# Patient Record
Sex: MALE | Race: WHITE | NOT HISPANIC OR LATINO | URBAN - METROPOLITAN AREA
[De-identification: names, ages, dates, MRNs, and addresses within clinical notes are randomized per-mention and may not be internally consistent; named-entity substitution may affect disease eponyms.]

---

## 2023-04-17 DIAGNOSIS — F41.9 ANXIETY: Primary | ICD-10-CM

## 2023-04-17 PROBLEM — L23.7 POISON IVY: Status: ACTIVE | Noted: 2023-04-17

## 2023-04-17 PROBLEM — L23.7 POISON SUMAC: Status: ACTIVE | Noted: 2023-04-17

## 2023-04-17 PROBLEM — I10 ESSENTIAL HYPERTENSION: Status: ACTIVE | Noted: 2023-04-17

## 2023-04-17 PROBLEM — J01.90 ACUTE SINUSITIS: Status: ACTIVE | Noted: 2023-04-17

## 2023-04-17 PROBLEM — Z86.711 HISTORY OF PULMONARY EMBOLISM: Status: ACTIVE | Noted: 2023-04-17

## 2023-04-17 PROBLEM — L25.5 DERMATITIS DUE TO PLANTS, INCLUDING POISON IVY, SUMAC, AND OAK: Status: ACTIVE | Noted: 2023-04-17

## 2023-04-17 PROBLEM — E78.2 HYPERLIPIDEMIA, MIXED: Status: ACTIVE | Noted: 2023-04-17

## 2023-04-17 RX ORDER — PAROXETINE HYDROCHLORIDE 20 MG/1
20 TABLET, FILM COATED ORAL DAILY
Qty: 30 TABLET | Refills: 3 | Status: SHIPPED | OUTPATIENT
Start: 2023-04-17 | End: 2023-12-19

## 2023-04-17 RX ORDER — LISINOPRIL 10 MG/1
1 TABLET ORAL DAILY
COMMUNITY
Start: 2015-09-02 | End: 2023-11-27

## 2023-04-17 RX ORDER — WARFARIN 6 MG/1
1 TABLET ORAL DAILY
COMMUNITY
Start: 2015-08-03 | End: 2023-10-20 | Stop reason: SDUPTHER

## 2023-04-17 RX ORDER — ATORVASTATIN CALCIUM 20 MG/1
1 TABLET, FILM COATED ORAL DAILY
COMMUNITY
Start: 2015-06-04 | End: 2023-10-20 | Stop reason: SDUPTHER

## 2023-04-17 RX ORDER — PAROXETINE HYDROCHLORIDE 20 MG/1
1 TABLET, FILM COATED ORAL DAILY
COMMUNITY
Start: 2015-05-28 | End: 2023-04-17 | Stop reason: SDUPTHER

## 2023-04-17 RX ORDER — LANSOPRAZOLE 30 MG/1
1 CAPSULE, DELAYED RELEASE ORAL 2 TIMES DAILY
COMMUNITY
Start: 2015-01-19 | End: 2023-11-27

## 2023-10-09 ENCOUNTER — APPOINTMENT (OUTPATIENT)
Dept: PRIMARY CARE | Facility: CLINIC | Age: 58
End: 2023-10-09
Payer: COMMERCIAL

## 2023-10-16 ASSESSMENT — PROMIS GLOBAL HEALTH SCALE
RATE_SOCIAL_SATISFACTION: EXCELLENT
RATE_PHYSICAL_HEALTH: VERY GOOD
RATE_MENTAL_HEALTH: EXCELLENT
EMOTIONAL_PROBLEMS: NEVER
RATE_GENERAL_HEALTH: EXCELLENT
RATE_AVERAGE_PAIN: 1
RATE_QUALITY_OF_LIFE: EXCELLENT
CARRYOUT_PHYSICAL_ACTIVITIES: COMPLETELY
CARRYOUT_SOCIAL_ACTIVITIES: EXCELLENT

## 2023-10-18 ENCOUNTER — OFFICE VISIT (OUTPATIENT)
Dept: PRIMARY CARE | Facility: CLINIC | Age: 58
End: 2023-10-18
Payer: COMMERCIAL

## 2023-10-18 ENCOUNTER — LAB (OUTPATIENT)
Dept: LAB | Facility: LAB | Age: 58
End: 2023-10-18
Payer: COMMERCIAL

## 2023-10-18 VITALS
SYSTOLIC BLOOD PRESSURE: 110 MMHG | DIASTOLIC BLOOD PRESSURE: 80 MMHG | TEMPERATURE: 97.3 F | BODY MASS INDEX: 26.01 KG/M2 | WEIGHT: 192 LBS | HEIGHT: 72 IN | HEART RATE: 65 BPM | OXYGEN SATURATION: 96 %

## 2023-10-18 DIAGNOSIS — Z12.11 ENCOUNTER FOR SCREENING FOR MALIGNANT NEOPLASM OF COLON: ICD-10-CM

## 2023-10-18 DIAGNOSIS — Z51.81 ANTICOAGULATION GOAL OF INR 2 TO 3: ICD-10-CM

## 2023-10-18 DIAGNOSIS — Z00.00 ROUTINE GENERAL MEDICAL EXAMINATION AT A HEALTH CARE FACILITY: Primary | ICD-10-CM

## 2023-10-18 DIAGNOSIS — Z79.01 ANTICOAGULATION GOAL OF INR 2 TO 3: ICD-10-CM

## 2023-10-18 DIAGNOSIS — E78.2 HYPERLIPIDEMIA, MIXED: ICD-10-CM

## 2023-10-18 DIAGNOSIS — I10 ESSENTIAL HYPERTENSION: ICD-10-CM

## 2023-10-18 DIAGNOSIS — Z00.00 ROUTINE GENERAL MEDICAL EXAMINATION AT A HEALTH CARE FACILITY: ICD-10-CM

## 2023-10-18 LAB
ALBUMIN SERPL BCP-MCNC: 4.6 G/DL (ref 3.4–5)
ALP SERPL-CCNC: 52 U/L (ref 33–120)
ALT SERPL W P-5'-P-CCNC: 19 U/L (ref 10–52)
ANION GAP SERPL CALC-SCNC: 13 MMOL/L (ref 10–20)
APPEARANCE UR: CLEAR
AST SERPL W P-5'-P-CCNC: 16 U/L (ref 9–39)
BASOPHILS # BLD AUTO: 0.03 X10*3/UL (ref 0–0.1)
BASOPHILS NFR BLD AUTO: 0.6 %
BILIRUB SERPL-MCNC: 0.6 MG/DL (ref 0–1.2)
BILIRUB UR STRIP.AUTO-MCNC: NEGATIVE MG/DL
BUN SERPL-MCNC: 19 MG/DL (ref 6–23)
CALCIUM SERPL-MCNC: 9.2 MG/DL (ref 8.6–10.6)
CHLORIDE SERPL-SCNC: 106 MMOL/L (ref 98–107)
CHOLEST SERPL-MCNC: 287 MG/DL (ref 0–199)
CHOLESTEROL/HDL RATIO: 5.4
CO2 SERPL-SCNC: 25 MMOL/L (ref 21–32)
COLOR UR: YELLOW
CREAT SERPL-MCNC: 0.86 MG/DL (ref 0.5–1.3)
EOSINOPHIL # BLD AUTO: 0.34 X10*3/UL (ref 0–0.7)
EOSINOPHIL NFR BLD AUTO: 7.3 %
ERYTHROCYTE [DISTWIDTH] IN BLOOD BY AUTOMATED COUNT: 12.6 % (ref 11.5–14.5)
GFR SERPL CREATININE-BSD FRML MDRD: >90 ML/MIN/1.73M*2
GLUCOSE SERPL-MCNC: 97 MG/DL (ref 74–99)
GLUCOSE UR STRIP.AUTO-MCNC: NEGATIVE MG/DL
HCT VFR BLD AUTO: 43.9 % (ref 41–52)
HCV AB SER QL: NONREACTIVE
HDLC SERPL-MCNC: 53.3 MG/DL
HGB BLD-MCNC: 14.6 G/DL (ref 13.5–17.5)
HIV 1+2 AB+HIV1 P24 AG SERPL QL IA: NONREACTIVE
IMM GRANULOCYTES # BLD AUTO: 0.01 X10*3/UL (ref 0–0.7)
IMM GRANULOCYTES NFR BLD AUTO: 0.2 % (ref 0–0.9)
INR PPP: 1.3 (ref 0.9–1.1)
KETONES UR STRIP.AUTO-MCNC: NEGATIVE MG/DL
LDLC SERPL CALC-MCNC: 218 MG/DL
LEUKOCYTE ESTERASE UR QL STRIP.AUTO: NEGATIVE
LYMPHOCYTES # BLD AUTO: 1.82 X10*3/UL (ref 1.2–4.8)
LYMPHOCYTES NFR BLD AUTO: 39.2 %
MCH RBC QN AUTO: 31.8 PG (ref 26–34)
MCHC RBC AUTO-ENTMCNC: 33.3 G/DL (ref 32–36)
MCV RBC AUTO: 96 FL (ref 80–100)
MONOCYTES # BLD AUTO: 0.35 X10*3/UL (ref 0.1–1)
MONOCYTES NFR BLD AUTO: 7.5 %
NEUTROPHILS # BLD AUTO: 2.09 X10*3/UL (ref 1.2–7.7)
NEUTROPHILS NFR BLD AUTO: 45.2 %
NITRITE UR QL STRIP.AUTO: NEGATIVE
NON HDL CHOLESTEROL: 234 MG/DL (ref 0–149)
NRBC BLD-RTO: 0 /100 WBCS (ref 0–0)
PH UR STRIP.AUTO: 5 [PH]
PLATELET # BLD AUTO: 222 X10*3/UL (ref 150–450)
PMV BLD AUTO: 9.5 FL (ref 7.5–11.5)
POTASSIUM SERPL-SCNC: 4.3 MMOL/L (ref 3.5–5.3)
PROT SERPL-MCNC: 7 G/DL (ref 6.4–8.2)
PROT UR STRIP.AUTO-MCNC: NEGATIVE MG/DL
PROTHROMBIN TIME: 14.2 SECONDS (ref 9.8–12.8)
PSA SERPL-MCNC: 0.41 NG/ML
RBC # BLD AUTO: 4.59 X10*6/UL (ref 4.5–5.9)
RBC # UR STRIP.AUTO: NEGATIVE /UL
SODIUM SERPL-SCNC: 140 MMOL/L (ref 136–145)
SP GR UR STRIP.AUTO: 1.02
TRIGL SERPL-MCNC: 77 MG/DL (ref 0–149)
UROBILINOGEN UR STRIP.AUTO-MCNC: <2 MG/DL
VLDL: 15 MG/DL (ref 0–40)
WBC # BLD AUTO: 4.6 X10*3/UL (ref 4.4–11.3)

## 2023-10-18 PROCEDURE — 84153 ASSAY OF PSA TOTAL: CPT

## 2023-10-18 PROCEDURE — 81003 URINALYSIS AUTO W/O SCOPE: CPT

## 2023-10-18 PROCEDURE — 86803 HEPATITIS C AB TEST: CPT

## 2023-10-18 PROCEDURE — 36415 COLL VENOUS BLD VENIPUNCTURE: CPT

## 2023-10-18 PROCEDURE — 85025 COMPLETE CBC W/AUTO DIFF WBC: CPT

## 2023-10-18 PROCEDURE — 87389 HIV-1 AG W/HIV-1&-2 AB AG IA: CPT

## 2023-10-18 PROCEDURE — 3074F SYST BP LT 130 MM HG: CPT | Performed by: FAMILY MEDICINE

## 2023-10-18 PROCEDURE — 3079F DIAST BP 80-89 MM HG: CPT | Performed by: FAMILY MEDICINE

## 2023-10-18 PROCEDURE — 80053 COMPREHEN METABOLIC PANEL: CPT

## 2023-10-18 PROCEDURE — 85610 PROTHROMBIN TIME: CPT

## 2023-10-18 PROCEDURE — 99396 PREV VISIT EST AGE 40-64: CPT | Performed by: FAMILY MEDICINE

## 2023-10-18 PROCEDURE — 1036F TOBACCO NON-USER: CPT | Performed by: FAMILY MEDICINE

## 2023-10-18 PROCEDURE — 80061 LIPID PANEL: CPT

## 2023-10-18 ASSESSMENT — PATIENT HEALTH QUESTIONNAIRE - PHQ9
1. LITTLE INTEREST OR PLEASURE IN DOING THINGS: NOT AT ALL
SUM OF ALL RESPONSES TO PHQ9 QUESTIONS 1 & 2: 0
2. FEELING DOWN, DEPRESSED OR HOPELESS: NOT AT ALL

## 2023-10-18 ASSESSMENT — LIFESTYLE VARIABLES
HOW OFTEN DO YOU HAVE A DRINK CONTAINING ALCOHOL: NEVER
HOW OFTEN DO YOU HAVE SIX OR MORE DRINKS ON ONE OCCASION: NEVER

## 2023-10-18 ASSESSMENT — PAIN SCALES - GENERAL: PAINLEVEL: 0-NO PAIN

## 2023-10-18 NOTE — PROGRESS NOTES
Subjective   Patient ID: Satish Burton is a 58 y.o. male who presents for Annual Exam (Patient is here for CPE, patient is fasting. ).  HPI  58-year-old male presents for complete physical exam.  No acute complaints today.  Review of Systems  Constitutional: no chills, no fever and no night sweats.   Eyes: no blurred vision and no eyesight problems.   ENT: no hearing loss, no nasal congestion, no nasal discharge, no hoarseness and no sore throat.   Cardiovascular: no chest pain, no intermittent leg claudication, no lower extremity edema, no palpitations and no syncope.   Respiratory: no cough, no shortness of breath during exertion, no shortness of breath at rest and no wheezing.   Gastrointestinal: no abdominal pain, no blood in stools, no constipation, no diarrhea, no melena, no nausea, no rectal pain and no vomiting.   Genitourinary: no dysuria, no change in urinary frequency, no urinary hesitancy and no feelings of urinary urgency.   Neurological: no difficulty walking, no headache, no limb weakness, no numbness and no tingling.   Psychiatric: no anxiety, no depression, no anhedonia and no substance use disorders.   Endocrine: no recent weight gain and no recent weight loss.   Hematologic/Lymphatic: no tendency for easy bruising and no swollen glands.  Visit Vitals  /80 (BP Location: Left arm, Patient Position: Sitting, BP Cuff Size: Adult)   Pulse 65   Temp 36.3 °C (97.3 °F) (Temporal)        Objective   Physical Exam  Constitutional: Alert and in no acute distress. Well developed, well nourished.   Eyes: Pupils were equal in size, round, reactive to light (PERRL) with normal accommodation and extraocular movements intact (EOMI). Ophthalmoscopic examination: Normal.   Ears, Nose, Mouth, and Throat: External inspection of ears and nose: Normal. Otoscopic examination: Normal. Lips, teeth, and gums: Normal. Oropharynx: Normal.   Neck: No neck mass was observed. Supple. Thyroid not enlarged and there were no  palpable thyroid nodules.   Cardiovascular: Heart rate and rhythm were normal, normal S1 and S2, no gallops, no murmurs and no pericardial rub. Carotid pulses: Normal with no bruits. Abdominal aorta: Normal. Pedal pulses: Normal. No peripheral edema.   Pulmonary: No respiratory distress. Clear bilateral breath sounds.   Abdomen: Soft nontender; no abdominal mass palpated. Normal bowel sounds. No organomegaly.   Skin: Normal skin color and pigmentation, normal skin turgor, and no rash.   Psychiatric: Judgment and insight: Intact. Mood and affect: Normal.  Assessment/Plan   Problem List Items Addressed This Visit             ICD-10-CM    Essential hypertension I10    Hyperlipidemia, mixed E78.2    Routine general medical examination at a health care facility - Primary Z00.00    Relevant Orders    Lipid Panel    Comprehensive Metabolic Panel    CBC and Auto Differential    Urinalysis with Reflex Microscopic    Prostate Spec.Ag,Screen    HIV-1 and HIV-2 antibodies    Hepatitis C antibody    Anticoagulation goal of INR 2 to 3 Z51.81, Z79.01    Relevant Orders    Protime-INR     Other Visit Diagnoses         Codes    Encounter for screening for malignant neoplasm of colon     Z12.11    Relevant Orders    Cologuard® colon cancer screening

## 2023-10-20 DIAGNOSIS — E78.2 HYPERLIPIDEMIA, MIXED: ICD-10-CM

## 2023-10-20 DIAGNOSIS — Z51.81 ANTICOAGULATION GOAL OF INR 2 TO 3: Primary | ICD-10-CM

## 2023-10-20 DIAGNOSIS — Z79.01 ANTICOAGULATION GOAL OF INR 2 TO 3: Primary | ICD-10-CM

## 2023-10-20 RX ORDER — WARFARIN 6 MG/1
6 TABLET ORAL EVERY EVENING
Qty: 90 TABLET | Refills: 3 | Status: SHIPPED | OUTPATIENT
Start: 2023-10-20 | End: 2024-02-26 | Stop reason: SDUPTHER

## 2023-10-20 RX ORDER — ATORVASTATIN CALCIUM 40 MG/1
40 TABLET, FILM COATED ORAL DAILY
Qty: 90 TABLET | Refills: 3 | Status: SHIPPED | OUTPATIENT
Start: 2023-10-20 | End: 2024-10-19

## 2023-11-26 DIAGNOSIS — I10 ESSENTIAL HYPERTENSION: Primary | ICD-10-CM

## 2023-11-26 DIAGNOSIS — K21.9 GASTROESOPHAGEAL REFLUX DISEASE WITHOUT ESOPHAGITIS: ICD-10-CM

## 2023-11-27 RX ORDER — LISINOPRIL 10 MG/1
10 TABLET ORAL DAILY
Qty: 90 TABLET | Refills: 0 | Status: SHIPPED | OUTPATIENT
Start: 2023-11-27 | End: 2024-02-28 | Stop reason: SDUPTHER

## 2023-11-27 RX ORDER — LANSOPRAZOLE 30 MG/1
30 CAPSULE, DELAYED RELEASE ORAL 2 TIMES DAILY
Qty: 180 CAPSULE | Refills: 0 | Status: SHIPPED | OUTPATIENT
Start: 2023-11-27 | End: 2024-03-11 | Stop reason: SDUPTHER

## 2023-12-12 ENCOUNTER — LAB (OUTPATIENT)
Dept: LAB | Facility: LAB | Age: 58
End: 2023-12-12
Payer: COMMERCIAL

## 2023-12-12 DIAGNOSIS — Z79.01 ANTICOAGULATION GOAL OF INR 2 TO 3: ICD-10-CM

## 2023-12-12 DIAGNOSIS — Z51.81 ANTICOAGULATION GOAL OF INR 2 TO 3: ICD-10-CM

## 2023-12-12 LAB
INR PPP: 2 (ref 0.9–1.1)
PROTHROMBIN TIME: 22.9 SECONDS (ref 9.8–12.8)

## 2023-12-12 PROCEDURE — 85610 PROTHROMBIN TIME: CPT

## 2023-12-12 PROCEDURE — 36415 COLL VENOUS BLD VENIPUNCTURE: CPT

## 2023-12-16 LAB — NONINV COLON CA DNA+OCC BLD SCRN STL QL: NEGATIVE

## 2023-12-19 DIAGNOSIS — F41.9 ANXIETY: ICD-10-CM

## 2023-12-19 RX ORDER — PAROXETINE HYDROCHLORIDE 20 MG/1
20 TABLET, FILM COATED ORAL DAILY
Qty: 30 TABLET | Refills: 0 | Status: SHIPPED | OUTPATIENT
Start: 2023-12-19 | End: 2024-02-02

## 2024-02-26 DIAGNOSIS — Z79.01 ANTICOAGULATION GOAL OF INR 2 TO 3: ICD-10-CM

## 2024-02-26 DIAGNOSIS — Z51.81 ANTICOAGULATION GOAL OF INR 2 TO 3: ICD-10-CM

## 2024-02-26 RX ORDER — WARFARIN 6 MG/1
12 TABLET ORAL EVERY OTHER DAY
Qty: 90 TABLET | Refills: 3 | Status: SHIPPED | OUTPATIENT
Start: 2024-02-26 | End: 2024-02-29 | Stop reason: SDUPTHER

## 2024-02-28 DIAGNOSIS — I10 ESSENTIAL HYPERTENSION: ICD-10-CM

## 2024-02-28 RX ORDER — LISINOPRIL 10 MG/1
10 TABLET ORAL DAILY
Qty: 90 TABLET | Refills: 0 | Status: SHIPPED | OUTPATIENT
Start: 2024-02-28 | End: 2024-05-29

## 2024-02-29 DIAGNOSIS — Z51.81 ANTICOAGULATION GOAL OF INR 2 TO 3: ICD-10-CM

## 2024-02-29 DIAGNOSIS — Z79.01 ANTICOAGULATION GOAL OF INR 2 TO 3: ICD-10-CM

## 2024-02-29 RX ORDER — WARFARIN 6 MG/1
12 TABLET ORAL EVERY OTHER DAY
Qty: 90 TABLET | Refills: 3 | Status: SHIPPED | OUTPATIENT
Start: 2024-02-29 | End: 2024-04-10 | Stop reason: SDUPTHER

## 2024-03-11 DIAGNOSIS — K21.9 GASTROESOPHAGEAL REFLUX DISEASE WITHOUT ESOPHAGITIS: ICD-10-CM

## 2024-03-11 RX ORDER — LANSOPRAZOLE 30 MG/1
30 CAPSULE, DELAYED RELEASE ORAL 2 TIMES DAILY
Qty: 180 CAPSULE | Refills: 0 | Status: SHIPPED | OUTPATIENT
Start: 2024-03-11

## 2024-04-10 DIAGNOSIS — Z51.81 ANTICOAGULATION GOAL OF INR 2 TO 3: ICD-10-CM

## 2024-04-10 DIAGNOSIS — Z79.01 ANTICOAGULATION GOAL OF INR 2 TO 3: ICD-10-CM

## 2024-04-10 RX ORDER — WARFARIN 6 MG/1
TABLET ORAL
Qty: 135 TABLET | Refills: 3 | Status: SHIPPED | OUTPATIENT
Start: 2024-04-10

## 2024-04-16 DIAGNOSIS — F41.9 ANXIETY: ICD-10-CM

## 2024-04-16 RX ORDER — PAROXETINE HYDROCHLORIDE 20 MG/1
20 TABLET, FILM COATED ORAL DAILY
Qty: 30 TABLET | Refills: 0 | Status: SHIPPED | OUTPATIENT
Start: 2024-04-16 | End: 2024-06-10

## 2024-06-08 DIAGNOSIS — F41.9 ANXIETY: ICD-10-CM

## 2024-06-10 RX ORDER — PAROXETINE HYDROCHLORIDE 20 MG/1
20 TABLET, FILM COATED ORAL DAILY
Qty: 90 TABLET | Refills: 3 | Status: SHIPPED | OUTPATIENT
Start: 2024-06-10

## 2024-07-12 ENCOUNTER — OFFICE VISIT (OUTPATIENT)
Dept: PRIMARY CARE | Facility: CLINIC | Age: 59
End: 2024-07-12
Payer: COMMERCIAL

## 2024-07-12 VITALS
OXYGEN SATURATION: 96 % | WEIGHT: 202.8 LBS | TEMPERATURE: 97.1 F | DIASTOLIC BLOOD PRESSURE: 82 MMHG | HEIGHT: 72 IN | HEART RATE: 59 BPM | SYSTOLIC BLOOD PRESSURE: 130 MMHG | BODY MASS INDEX: 27.47 KG/M2

## 2024-07-12 DIAGNOSIS — K14.8 TONGUE LESION: Primary | ICD-10-CM

## 2024-07-12 PROCEDURE — 1036F TOBACCO NON-USER: CPT | Performed by: FAMILY MEDICINE

## 2024-07-12 PROCEDURE — 3075F SYST BP GE 130 - 139MM HG: CPT | Performed by: FAMILY MEDICINE

## 2024-07-12 PROCEDURE — 99213 OFFICE O/P EST LOW 20 MIN: CPT | Performed by: FAMILY MEDICINE

## 2024-07-12 PROCEDURE — 3079F DIAST BP 80-89 MM HG: CPT | Performed by: FAMILY MEDICINE

## 2024-07-12 ASSESSMENT — PAIN SCALES - GENERAL: PAINLEVEL: 0-NO PAIN

## 2024-07-12 NOTE — PROGRESS NOTES
Subjective   Patient ID: Satish Burton is a 58 y.o. male who presents for tongue (Patient is present today for a bump on tip of tongue. Has been there for 2 weeks.).  HPI  58-year-old male for concern regarding lesion on the tip of his tongue seen by his dentist.  Dentist recommended biopsy.  Review of Systems  See HPI  Visit Vitals  /82 (BP Location: Left arm, Patient Position: Sitting, BP Cuff Size: Adult)   Pulse 59   Temp 36.2 °C (97.1 °F) (Temporal)        Objective   Physical Exam  0.5 cm x 0.5 cm dark purple lesion on the tip of the tongue.  Assessment/Plan   Problem List Items Addressed This Visit             ICD-10-CM    Tongue lesion - Primary K14.8    Relevant Orders    Referral to Oral Maxillofacial Surgery   Lesion resembles a dilated vein below the surface of the tongue.  Oral surgery referral.

## 2024-08-25 ENCOUNTER — HOSPITAL ENCOUNTER (EMERGENCY)
Facility: HOSPITAL | Age: 59
Discharge: HOME | End: 2024-08-25
Attending: EMERGENCY MEDICINE
Payer: COMMERCIAL

## 2024-08-25 ENCOUNTER — APPOINTMENT (OUTPATIENT)
Dept: CARDIOLOGY | Facility: HOSPITAL | Age: 59
End: 2024-08-25
Payer: COMMERCIAL

## 2024-08-25 VITALS
WEIGHT: 200 LBS | TEMPERATURE: 97.9 F | HEART RATE: 75 BPM | SYSTOLIC BLOOD PRESSURE: 139 MMHG | RESPIRATION RATE: 20 BRPM | BODY MASS INDEX: 28.63 KG/M2 | DIASTOLIC BLOOD PRESSURE: 91 MMHG | OXYGEN SATURATION: 96 % | HEIGHT: 70 IN

## 2024-08-25 DIAGNOSIS — S80.12XA CONTUSION OF LEFT LOWER LEG, INITIAL ENCOUNTER: Primary | ICD-10-CM

## 2024-08-25 DIAGNOSIS — R60.0 LOCALIZED EDEMA: ICD-10-CM

## 2024-08-25 PROCEDURE — 99284 EMERGENCY DEPT VISIT MOD MDM: CPT | Mod: 25

## 2024-08-25 PROCEDURE — 93971 EXTREMITY STUDY: CPT | Performed by: INTERNAL MEDICINE

## 2024-08-25 PROCEDURE — 93971 EXTREMITY STUDY: CPT

## 2024-08-25 ASSESSMENT — PAIN DESCRIPTION - ONSET: ONSET: ONGOING

## 2024-08-25 ASSESSMENT — LIFESTYLE VARIABLES
EVER FELT BAD OR GUILTY ABOUT YOUR DRINKING: NO
EVER HAD A DRINK FIRST THING IN THE MORNING TO STEADY YOUR NERVES TO GET RID OF A HANGOVER: NO
TOTAL SCORE: 0
HAVE YOU EVER FELT YOU SHOULD CUT DOWN ON YOUR DRINKING: NO
HAVE PEOPLE ANNOYED YOU BY CRITICIZING YOUR DRINKING: NO

## 2024-08-25 ASSESSMENT — PAIN SCALES - GENERAL: PAINLEVEL_OUTOF10: 2

## 2024-08-25 ASSESSMENT — PAIN DESCRIPTION - ORIENTATION: ORIENTATION: LEFT;UPPER

## 2024-08-25 ASSESSMENT — PAIN DESCRIPTION - PAIN TYPE: TYPE: ACUTE PAIN

## 2024-08-25 ASSESSMENT — PAIN DESCRIPTION - FREQUENCY: FREQUENCY: CONSTANT/CONTINUOUS

## 2024-08-25 ASSESSMENT — PAIN - FUNCTIONAL ASSESSMENT: PAIN_FUNCTIONAL_ASSESSMENT: 0-10

## 2024-08-25 ASSESSMENT — PAIN DESCRIPTION - DESCRIPTORS: DESCRIPTORS: ACHING

## 2024-08-25 ASSESSMENT — COLUMBIA-SUICIDE SEVERITY RATING SCALE - C-SSRS
1. IN THE PAST MONTH, HAVE YOU WISHED YOU WERE DEAD OR WISHED YOU COULD GO TO SLEEP AND NOT WAKE UP?: NO
2. HAVE YOU ACTUALLY HAD ANY THOUGHTS OF KILLING YOURSELF?: NO
6. HAVE YOU EVER DONE ANYTHING, STARTED TO DO ANYTHING, OR PREPARED TO DO ANYTHING TO END YOUR LIFE?: NO

## 2024-08-25 ASSESSMENT — PAIN DESCRIPTION - PROGRESSION: CLINICAL_PROGRESSION: NOT CHANGED

## 2024-08-25 ASSESSMENT — PAIN DESCRIPTION - LOCATION: LOCATION: LEG

## 2024-08-25 NOTE — DISCHARGE INSTRUCTIONS
Your ultrasound today did not show any evidence of blood clot in your leg.  There is no need for further workup or treatment at this time.  Please be sure to follow-up with your primary care provider for routine ER follow-up.  Otherwise return to the ER if you have any worsening of the bruising, swelling, pain in your leg.

## 2024-08-25 NOTE — ED PROVIDER NOTES
EMERGENCY DEPARTMENT ENCOUNTER      Pt Name: Satish Burton  MRN: [id number]  Birthdate 1965  Date of evaluation: 8/25/2024  Provider: Andrea Hughes DO    CHIEF COMPLAINT       Chief Complaint   Patient presents with    leg bruising     Leg bruising on upper left thigh x3-4 days. Patient endorses that he came to ER to r/o blood clot because he had a clot 20+ years ago. Patient is on warfarin. Denies CP/SOB during triage.         HISTORY OF PRESENT ILLNESS    Satish Burton is a 59 y.o. male pmhx HLD, HTN, PE 22 years ago on Warfarin who presents to the ED for left thigh bruising. Pt reports he first noticed the bruising this morning. He has minimal pain at rest in that leg and the pain goes away when he is moving. He traveled to [place] last week and drove up from [place] last night. Did a lot of walking yesterday. He reports bumping this area on Monday and did not have any bruising at the time. He has not missed any doses of warfarin. Denies headache, lightheadedness, cp, sob, n/v/d, abdominal pain. No personal or family history of MI or stroke. Previous surgery on left lower leg to remove lipoma in his 20s. Reports they removed one of his veins, not sure which one.           History provided by:  Patient   used: No        Nursing Notes were reviewed.    PAST MEDICAL HISTORY   No past medical history on file.      SURGICAL HISTORY     No past surgical history on file.      CURRENT MEDICATIONS       Previous Medications    ATORVASTATIN (LIPITOR) 40 MG TABLET    Take 1 tablet (40 mg) by mouth once daily.    LANSOPRAZOLE (PREVACID) 30 MG DR CAPSULE    TAKE 1 CAPSULE BY MOUTH 2 TIMES A DAY    LISINOPRIL 10 MG TABLET    TAKE 1 TABLET BY MOUTH EVERY DAY    PAROXETINE (PAXIL) 20 MG TABLET    TAKE 1 TABLET BY MOUTH EVERY DAY    WARFARIN (COUMADIN) 6 MG TABLET    Take 1 tablet (6 mg) by mouth see administration instructions AND 2 tablets (12 mg) see administration instructions. Patient is to alternate 6 mg  and 12 mg by day..       ALLERGIES     Erythromycin    FAMILY HISTORY     No family history on file.       SOCIAL HISTORY       Social History     Socioeconomic History    Marital status:    Tobacco Use    Smoking status: Never    Smokeless tobacco: Never   Substance and Sexual Activity    Alcohol use: Never    Drug use: Never       SCREENINGS                        PHYSICAL EXAM    (up to 7 for level 4, 8 or more for level 5)     ED Triage Vitals [08/25/24 1447]   Temperature Heart Rate Respirations BP   36.7 °C (98.1 °F) 82 18 173/79      Pulse Ox Temp Source Heart Rate Source Patient Position   96 % Temporal -- Sitting      BP Location FiO2 (%)     Right arm --       Physical Exam  Constitutional:       Appearance: Normal appearance.   HENT:      Head: Normocephalic and atraumatic.   Eyes:      Extraocular Movements: Extraocular movements intact.      Conjunctiva/sclera: Conjunctivae normal.   Cardiovascular:      Rate and Rhythm: Normal rate and regular rhythm.      Pulses:           Posterior tibial pulses are 2+ on the left side.   Pulmonary:      Effort: Pulmonary effort is normal.      Breath sounds: Normal breath sounds.   Musculoskeletal:         General: Normal range of motion.      Comments: Bruising noted to medial distal aspect of left thigh. Some tenderness on palpation. No erythema noted. Pt does not endorse burning pain. Full ROM of left hip, knee, and ankle. Large scar noted on medial left lower leg, from distal knee to proximal ankle.     Skin:     General: Skin is warm.   Neurological:      General: No focal deficit present.      Mental Status: He is alert and oriented to person, place, and time.   Psychiatric:         Mood and Affect: Mood normal.          DIAGNOSTIC RESULTS     LABS:  Labs Reviewed - No data to display    All other labs were within normal range or not returned as of this dictation.    Imaging  Lower extremity venous duplex left              Procedures  Procedures      EMERGENCY DEPARTMENT COURSE/MDM:   Medical Decision Making  Satish Burton is a 59 y.o. male pmhx HLD, HTN, PE 22 years ago on Warfarin who presents to the ED for left thigh bruising. Pt bumped medial thigh on Monday. Noticed bruising in area this morning. No missed doses of warfarin. Recent travel from [place] yesterday evening. Not endorsing pain. Exam notable for bruising in medial aspect of distal thigh. No other trauma noted. No erythema noted. Slightly tender on palpation. Pt is afebrile and in no acute distress. Not currently endorsing pain.   Differentials include but not limited to soft tissue injury, DVT, superficial thrombophlebitis. Ultrasound ordered to assess for vascular abnormalities was normal with no evidence of acute DVT. Pt stable for discharge and given return precautions should symptoms worsen.    I have seen and evaluated the patient and agree with the medical student's documentation as above.  I have edited and made adjustments as needed.  Plan of care was discussed with the attending physician.    Please see ED course for the rest of the MDM.    Andrea Hughes DO, PGY-3  Emergency Medicine        Amount and/or Complexity of Data Reviewed  Radiology: ordered. Decision-making details documented in ED Course.        Please see ED course for additional MDM.    Diagnoses as of 08/25/24 1630   Contusion of left lower leg, initial encounter        Lower extremity venous duplex left             Patient and or family in agreement and understanding of treatment plan.  All questions answered.      I reviewed the case with the attending ED physician. The attending ED physician agrees with the plan. Patient and/or patient´s representative was counseled regarding labs, imaging, likely diagnosis, and plan. All questions were answered.    ED Medications administered this visit:  Medications - No data to display    New Prescriptions from this visit:    New Prescriptions    No medications on file        Follow-up:  Lucio Toledo V, DO  [address] [facility]  [address]  [place] [id number]  [phone]    Schedule an appointment as soon as possible for a visit           Final Impression:   1. Contusion of left lower leg, initial encounter    2. Localized edema          (Please note that portions of this note were completed with a voice recognition program.  Efforts were made to edit the dictations but occasionally words are mis-transcribed.)     Andrea Hughes DO  Resident  08/25/24 1630      The patient was seen by the resident/fellow.  I have personally performed a substantive portion of the encounter.  I have seen and examined the patient; agree with the workup, evaluation, MDM, management and diagnosis.  The care plan has been discussed with the resident; I have reviewed the resident’s note and agree with the documented findings.                                   Markus Rai,   09/04/24 1300

## 2024-10-17 DIAGNOSIS — K21.9 GASTROESOPHAGEAL REFLUX DISEASE WITHOUT ESOPHAGITIS: ICD-10-CM

## 2024-10-18 RX ORDER — LANSOPRAZOLE 30 MG/1
30 CAPSULE, DELAYED RELEASE ORAL 2 TIMES DAILY
Qty: 180 CAPSULE | Refills: 0 | Status: SHIPPED | OUTPATIENT
Start: 2024-10-18

## 2024-10-21 ENCOUNTER — APPOINTMENT (OUTPATIENT)
Dept: PRIMARY CARE | Facility: CLINIC | Age: 59
End: 2024-10-21
Payer: COMMERCIAL

## 2024-10-21 ENCOUNTER — LAB (OUTPATIENT)
Dept: LAB | Facility: LAB | Age: 59
End: 2024-10-21
Payer: COMMERCIAL

## 2024-10-21 VITALS
WEIGHT: 207 LBS | BODY MASS INDEX: 28.04 KG/M2 | HEART RATE: 54 BPM | TEMPERATURE: 98 F | HEIGHT: 72 IN | SYSTOLIC BLOOD PRESSURE: 130 MMHG | DIASTOLIC BLOOD PRESSURE: 78 MMHG | OXYGEN SATURATION: 97 %

## 2024-10-21 DIAGNOSIS — Z00.00 ROUTINE GENERAL MEDICAL EXAMINATION AT A HEALTH CARE FACILITY: ICD-10-CM

## 2024-10-21 DIAGNOSIS — Z79.01 ANTICOAGULATION GOAL OF INR 2 TO 3: ICD-10-CM

## 2024-10-21 DIAGNOSIS — Z51.81 ANTICOAGULATION GOAL OF INR 2 TO 3: ICD-10-CM

## 2024-10-21 DIAGNOSIS — Z00.00 ROUTINE GENERAL MEDICAL EXAMINATION AT A HEALTH CARE FACILITY: Primary | ICD-10-CM

## 2024-10-21 DIAGNOSIS — E78.2 HYPERLIPIDEMIA, MIXED: ICD-10-CM

## 2024-10-21 DIAGNOSIS — I10 ESSENTIAL HYPERTENSION: ICD-10-CM

## 2024-10-21 PROCEDURE — 85025 COMPLETE CBC W/AUTO DIFF WBC: CPT

## 2024-10-21 PROCEDURE — 1036F TOBACCO NON-USER: CPT | Performed by: FAMILY MEDICINE

## 2024-10-21 PROCEDURE — 3075F SYST BP GE 130 - 139MM HG: CPT | Performed by: FAMILY MEDICINE

## 2024-10-21 PROCEDURE — 36415 COLL VENOUS BLD VENIPUNCTURE: CPT

## 2024-10-21 PROCEDURE — 99396 PREV VISIT EST AGE 40-64: CPT | Performed by: FAMILY MEDICINE

## 2024-10-21 PROCEDURE — 3008F BODY MASS INDEX DOCD: CPT | Performed by: FAMILY MEDICINE

## 2024-10-21 PROCEDURE — 81003 URINALYSIS AUTO W/O SCOPE: CPT

## 2024-10-21 PROCEDURE — 80061 LIPID PANEL: CPT

## 2024-10-21 PROCEDURE — 80053 COMPREHEN METABOLIC PANEL: CPT

## 2024-10-21 PROCEDURE — 84153 ASSAY OF PSA TOTAL: CPT

## 2024-10-21 PROCEDURE — 3078F DIAST BP <80 MM HG: CPT | Performed by: FAMILY MEDICINE

## 2024-10-21 ASSESSMENT — PAIN SCALES - GENERAL: PAINLEVEL_OUTOF10: 0-NO PAIN

## 2024-10-21 NOTE — PROGRESS NOTES
Subjective   Patient ID: Satish Burton is a 59 y.o. male who presents for Annual Exam (Patient is here for CPE, patient is fasting. ).  HPI  59-year-old male presents for complete physical exam.  Review of Systems  Constitutional: no chills, no fever and no night sweats.   Eyes: no blurred vision and no eyesight problems.   ENT: no hearing loss, no nasal congestion, no nasal discharge, no hoarseness and no sore throat.   Cardiovascular: no chest pain, no intermittent leg claudication, no lower extremity edema, no palpitations and no syncope.   Respiratory: no cough, no shortness of breath during exertion, no shortness of breath at rest and no wheezing.   Gastrointestinal: no abdominal pain, no blood in stools, no constipation, no diarrhea, no melena, no nausea, no rectal pain and no vomiting.   Genitourinary: no dysuria, no change in urinary frequency, no urinary hesitancy and no feelings of urinary urgency.   Neurological: no difficulty walking, no headache, no limb weakness, no numbness and no tingling.   Psychiatric: no anxiety, no depression, no anhedonia and no substance use disorders.   Endocrine: no recent weight gain and no recent weight loss.   Hematologic/Lymphatic: no tendency for easy bruising and no swollen glands.  Visit Vitals  /78 (BP Location: Left arm, Patient Position: Sitting, BP Cuff Size: Adult)   Pulse 54   Temp 36.7 °C (98 °F) (Temporal)        Objective   Physical Exam  Constitutional: Alert and in no acute distress. Well developed, well nourished.   Eyes: Pupils were equal in size, round, reactive to light (PERRL) with normal accommodation and extraocular movements intact (EOMI). Ophthalmoscopic examination: Normal.   Ears, Nose, Mouth, and Throat: External inspection of ears and nose: Normal. Otoscopic examination: Normal. Lips, teeth, and gums: Normal. Oropharynx: Normal.   Neck: No neck mass was observed. Supple. Thyroid not enlarged and there were no palpable thyroid nodules.    Cardiovascular: Heart rate and rhythm were normal, normal S1 and S2, no gallops, no murmurs and no pericardial rub. Carotid pulses: Normal with no bruits. Abdominal aorta: Normal. Pedal pulses: Normal. No peripheral edema.   Pulmonary: No respiratory distress. Clear bilateral breath sounds.   Abdomen: Soft nontender; no abdominal mass palpated. Normal bowel sounds. No organomegaly.   Skin: Normal skin color and pigmentation, normal skin turgor, and no rash.   Psychiatric: Judgment and insight: Intact. Mood and affect: Normal.  Assessment/Plan   Problem List Items Addressed This Visit             ICD-10-CM    Essential hypertension I10    Hyperlipidemia, mixed E78.2    Routine general medical examination at a health care facility - Primary Z00.00    Relevant Orders    Lipid Panel    Comprehensive Metabolic Panel    CBC and Auto Differential    Urinalysis with Reflex Microscopic    Prostate Spec.Ag,Screen    Anticoagulation goal of INR 2 to 3 Z51.81, Z79.01

## 2024-10-22 LAB
ALBUMIN SERPL BCP-MCNC: 4.4 G/DL (ref 3.4–5)
ALP SERPL-CCNC: 55 U/L (ref 33–120)
ALT SERPL W P-5'-P-CCNC: 18 U/L (ref 10–52)
ANION GAP SERPL CALC-SCNC: 11 MMOL/L (ref 10–20)
APPEARANCE UR: CLEAR
AST SERPL W P-5'-P-CCNC: 18 U/L (ref 9–39)
BASOPHILS # BLD AUTO: 0.03 X10*3/UL (ref 0–0.1)
BASOPHILS NFR BLD AUTO: 0.4 %
BILIRUB SERPL-MCNC: 0.6 MG/DL (ref 0–1.2)
BILIRUB UR STRIP.AUTO-MCNC: NEGATIVE MG/DL
BUN SERPL-MCNC: 14 MG/DL (ref 6–23)
CALCIUM SERPL-MCNC: 9.3 MG/DL (ref 8.6–10.6)
CHLORIDE SERPL-SCNC: 102 MMOL/L (ref 98–107)
CHOLEST SERPL-MCNC: 174 MG/DL (ref 0–199)
CHOLESTEROL/HDL RATIO: 3
CO2 SERPL-SCNC: 29 MMOL/L (ref 21–32)
COLOR UR: NORMAL
CREAT SERPL-MCNC: 0.93 MG/DL (ref 0.5–1.3)
EGFRCR SERPLBLD CKD-EPI 2021: >90 ML/MIN/1.73M*2
EOSINOPHIL # BLD AUTO: 0.41 X10*3/UL (ref 0–0.7)
EOSINOPHIL NFR BLD AUTO: 6 %
ERYTHROCYTE [DISTWIDTH] IN BLOOD BY AUTOMATED COUNT: 12.8 % (ref 11.5–14.5)
GLUCOSE SERPL-MCNC: 91 MG/DL (ref 74–99)
GLUCOSE UR STRIP.AUTO-MCNC: NORMAL MG/DL
HCT VFR BLD AUTO: 40.9 % (ref 41–52)
HDLC SERPL-MCNC: 57.2 MG/DL
HGB BLD-MCNC: 13.7 G/DL (ref 13.5–17.5)
IMM GRANULOCYTES # BLD AUTO: 0.01 X10*3/UL (ref 0–0.7)
IMM GRANULOCYTES NFR BLD AUTO: 0.1 % (ref 0–0.9)
KETONES UR STRIP.AUTO-MCNC: NEGATIVE MG/DL
LDLC SERPL CALC-MCNC: 97 MG/DL
LEUKOCYTE ESTERASE UR QL STRIP.AUTO: NEGATIVE
LYMPHOCYTES # BLD AUTO: 2.74 X10*3/UL (ref 1.2–4.8)
LYMPHOCYTES NFR BLD AUTO: 40.4 %
MCH RBC QN AUTO: 32.4 PG (ref 26–34)
MCHC RBC AUTO-ENTMCNC: 33.5 G/DL (ref 32–36)
MCV RBC AUTO: 97 FL (ref 80–100)
MONOCYTES # BLD AUTO: 0.52 X10*3/UL (ref 0.1–1)
MONOCYTES NFR BLD AUTO: 7.7 %
NEUTROPHILS # BLD AUTO: 3.07 X10*3/UL (ref 1.2–7.7)
NEUTROPHILS NFR BLD AUTO: 45.4 %
NITRITE UR QL STRIP.AUTO: NEGATIVE
NON HDL CHOLESTEROL: 117 MG/DL (ref 0–149)
NRBC BLD-RTO: 0 /100 WBCS (ref 0–0)
PH UR STRIP.AUTO: 5.5 [PH]
PLATELET # BLD AUTO: 284 X10*3/UL (ref 150–450)
POTASSIUM SERPL-SCNC: 4.1 MMOL/L (ref 3.5–5.3)
PROT SERPL-MCNC: 6.9 G/DL (ref 6.4–8.2)
PROT UR STRIP.AUTO-MCNC: NEGATIVE MG/DL
PSA SERPL-MCNC: 0.5 NG/ML
RBC # BLD AUTO: 4.23 X10*6/UL (ref 4.5–5.9)
RBC # UR STRIP.AUTO: NEGATIVE /UL
SODIUM SERPL-SCNC: 138 MMOL/L (ref 136–145)
SP GR UR STRIP.AUTO: 1.02
TRIGL SERPL-MCNC: 98 MG/DL (ref 0–149)
UROBILINOGEN UR STRIP.AUTO-MCNC: NORMAL MG/DL
VLDL: 20 MG/DL (ref 0–40)
WBC # BLD AUTO: 6.8 X10*3/UL (ref 4.4–11.3)

## 2025-02-19 DIAGNOSIS — I10 ESSENTIAL HYPERTENSION: ICD-10-CM

## 2025-02-20 RX ORDER — LISINOPRIL 10 MG/1
10 TABLET ORAL DAILY
Qty: 90 TABLET | Refills: 0 | Status: SHIPPED | OUTPATIENT
Start: 2025-02-20

## 2025-05-20 DIAGNOSIS — K21.9 GASTROESOPHAGEAL REFLUX DISEASE WITHOUT ESOPHAGITIS: ICD-10-CM

## 2025-05-20 RX ORDER — LANSOPRAZOLE 30 MG/1
30 CAPSULE, DELAYED RELEASE ORAL 2 TIMES DAILY
Qty: 180 CAPSULE | Refills: 3 | Status: SHIPPED | OUTPATIENT
Start: 2025-05-20

## 2025-07-08 DIAGNOSIS — F41.9 ANXIETY: ICD-10-CM

## 2025-07-08 RX ORDER — PAROXETINE 20 MG/1
20 TABLET, FILM COATED ORAL DAILY
Qty: 90 TABLET | Refills: 3 | Status: SHIPPED | OUTPATIENT
Start: 2025-07-08

## 2025-08-05 DIAGNOSIS — E78.2 HYPERLIPIDEMIA, MIXED: ICD-10-CM

## 2025-08-05 RX ORDER — ATORVASTATIN CALCIUM 40 MG/1
40 TABLET, FILM COATED ORAL
Qty: 90 TABLET | Refills: 0 | Status: SHIPPED | OUTPATIENT
Start: 2025-08-05

## 2025-08-27 DIAGNOSIS — Z79.01 ANTICOAGULATION GOAL OF INR 2 TO 3: ICD-10-CM

## 2025-08-27 DIAGNOSIS — Z51.81 ANTICOAGULATION GOAL OF INR 2 TO 3: ICD-10-CM

## 2025-08-27 RX ORDER — WARFARIN SODIUM 6 MG/1
TABLET ORAL
Qty: 135 TABLET | Refills: 0 | Status: SHIPPED | OUTPATIENT
Start: 2025-08-27